# Patient Record
Sex: FEMALE | Race: OTHER | HISPANIC OR LATINO
[De-identification: names, ages, dates, MRNs, and addresses within clinical notes are randomized per-mention and may not be internally consistent; named-entity substitution may affect disease eponyms.]

---

## 2022-07-07 ENCOUNTER — APPOINTMENT (OUTPATIENT)
Dept: VASCULAR SURGERY | Facility: CLINIC | Age: 77
End: 2022-07-07

## 2023-08-31 ENCOUNTER — APPOINTMENT (OUTPATIENT)
Dept: GYNECOLOGIC ONCOLOGY | Facility: CLINIC | Age: 78
End: 2023-08-31
Payer: MEDICARE

## 2023-08-31 VITALS
TEMPERATURE: 97.8 F | BODY MASS INDEX: 30.29 KG/M2 | RESPIRATION RATE: 18 BRPM | DIASTOLIC BLOOD PRESSURE: 81 MMHG | HEART RATE: 60 BPM | SYSTOLIC BLOOD PRESSURE: 145 MMHG | OXYGEN SATURATION: 98 % | WEIGHT: 121 LBS

## 2023-08-31 PROCEDURE — 99213 OFFICE O/P EST LOW 20 MIN: CPT

## 2023-08-31 RX ORDER — LOSARTAN POTASSIUM AND HYDROCHLOROTHIAZIDE 50; 12.5 MG/1; MG/1
50-12.5 TABLET, FILM COATED ORAL
Refills: 0 | Status: ACTIVE | COMMUNITY

## 2023-08-31 RX ORDER — GABAPENTIN 100 MG
100 TABLET ORAL
Refills: 0 | Status: ACTIVE | COMMUNITY

## 2023-08-31 RX ORDER — MECLIZINE HYDROCHLORIDE 12.5 MG/1
12.5 TABLET ORAL
Refills: 0 | Status: ACTIVE | COMMUNITY

## 2023-08-31 NOTE — HISTORY OF PRESENT ILLNESS
[FreeTextEntry1] : [place]  used for the encounter  76 y/o patient with a history of endometrial cancer s/p surgery in 2019. She has not seen us since the pandemic started the last visit was in 7/2019 in the [facility].  She c/o vaginal dryness  she has been using a clotrimazole/betamethasone cream with some benefits. she reported no vaginal bleeding, no pain  Normal GI and  function.  Onc hx  9/12/2019 endometrioid carcinoma grade 1 10/2/2019 RA [facility]BS[facility] unsuccessful sentinel node evaluation, PLN pathology showed endometrioid carcinoma with superficial myometrial invasion, no eduardo involvement 0/9 pelvic nodes, stage IAG1   Mammogram is up to date colonoscopy up to date

## 2023-08-31 NOTE — ASSESSMENT
[FreeTextEntry1] : Patient is clinically LUBA her HCM is up to date she is clinically stable  she is c/o vaginal dryness clotrimazole/betamathasone seem to be helping will prescribe her more RTO 1 yaer or earlier if there is any issue. Answered all questions RTO 6 months

## 2023-08-31 NOTE — PHYSICAL EXAM
[Abnormal] : Examination of extremities for edema and/or varicosities: Abnormal [Absent] : Adnexa(ae): Absent [Normal] : Recto-Vaginal Exam: Normal [Fully active, able to carry on all pre-disease performance without restriction] : Status 0 - Fully active, able to carry on all pre-disease performance without restriction [de-identified] : some swelling of her lower extremities not tender

## 2023-11-10 ENCOUNTER — EMERGENCY (EMERGENCY)
Facility: HOSPITAL | Age: 78
LOS: 1 days | Discharge: HOME | End: 2023-11-10
Attending: EMERGENCY MEDICINE
Payer: COMMERCIAL

## 2023-11-10 VITALS
HEART RATE: 61 BPM | TEMPERATURE: 98 F | SYSTOLIC BLOOD PRESSURE: 135 MMHG | OXYGEN SATURATION: 96 % | RESPIRATION RATE: 18 BRPM | DIASTOLIC BLOOD PRESSURE: 74 MMHG

## 2023-11-10 VITALS
WEIGHT: 128.09 LBS | SYSTOLIC BLOOD PRESSURE: 138 MMHG | TEMPERATURE: 97 F | DIASTOLIC BLOOD PRESSURE: 82 MMHG | HEART RATE: 61 BPM | RESPIRATION RATE: 18 BRPM | OXYGEN SATURATION: 98 % | HEIGHT: 57 IN

## 2023-11-10 PROCEDURE — 70450 CT HEAD/BRAIN W/O DYE: CPT | Mod: 26,MA

## 2023-11-10 PROCEDURE — 70450 CT HEAD/BRAIN W/O DYE: CPT | Mod: MA

## 2023-11-10 PROCEDURE — 73522 X-RAY EXAM HIPS BI 3-4 VIEWS: CPT | Mod: 26

## 2023-11-10 PROCEDURE — 99284 EMERGENCY DEPT VISIT MOD MDM: CPT

## 2023-11-10 PROCEDURE — 73522 X-RAY EXAM HIPS BI 3-4 VIEWS: CPT

## 2023-11-10 PROCEDURE — 99284 EMERGENCY DEPT VISIT MOD MDM: CPT | Mod: 25

## 2023-11-10 RX ORDER — ACETAMINOPHEN 500 MG
650 TABLET ORAL ONCE
Refills: 0 | Status: COMPLETED | OUTPATIENT
Start: 2023-11-10 | End: 2023-11-10

## 2023-11-10 RX ADMIN — Medication 650 MILLIGRAM(S): at 14:45

## 2023-11-10 NOTE — ED PROVIDER NOTE - NS ED ROS FT
CONSTITUTIONAL: No fevers, no chills, no lightheadedness, no dizziness  EYES: no visual changes, no eye pain  EARS: no ear drainage, no ear pain, no change in hearing  NOSE: no nasal congestion  MOUTH/THROAT: no sore throat  CV: No chest pain, no palpitations  RESP: No SOB, no cough  GI: No n/v/d, no abd pain  : no dysuria, no hematuria, no flank pain  MSK: no back pain, + pelvic pain   SKIN: no rashes  NEURO: + headache, no focal weakness, no decreased sensation/parasthesias   PSYCHIATRIC: no known mental health issues

## 2023-11-10 NOTE — ED PROVIDER NOTE - PATIENT PORTAL LINK FT
You can access the FollowMyHealth Patient Portal offered by [facility] by registering at the following website: [email or web address]. By joining [facility]’s FollowMyHealth portal, you will also be able to view your health information using other applications (apps) compatible with our system.

## 2023-11-10 NOTE — ED PROVIDER NOTE - PHYSICAL EXAMINATION
GEN: NAD, awake, eyes open spontaneously  EYES: normal conjunctiva, perrl, eomi   ENT: NCAT, No palpable scalp hematoma, no raccoon eyes or ruth signs, MMM, Trachea midline  NECK: No midline cervical spine tenderness or step-offs.   CHEST/LUNGS: Non-tachypneic, CTAB, bilateral breath sounds  CARDIAC: Non-tachycardic, normal perfusion  ABDOMEN: Soft, NTND, No rebound/guarding  MSK: Tenderness of bilateral gluteal muscles, tenderness along bilateral ischial no gross deformity of extremities  SKIN: No rashes, no petechiae, no vesicles  NEURO: Ambulating with non-ataxic gait, CN grossly intact, normal coordination, no focal motor or sensory deficits  PSYCH: Alert, appropriate, cooperative, with capacity and insight

## 2023-11-10 NOTE — ED PROVIDER NOTE - ATTENDING CONTRIBUTION TO CARE
Attending MD York: I personally have seen and examined this patient.  Resident note reviewed and agree on plan of care and except where noted.  See below for details.     seen in [facility]    78F with PMH/PSH including HTN, CAD on ASA 81mg, presents to the ED with bilateral buttock pain and head trauma s/p fall yesterday. Reports was greeted by an excited 50lb pitbull, who jumped up on her, and force knocked her over. Reports hit back of head and buttocks on concrete. Reports had brief LOC. Reports bump to back of head is markedly improved since yesterday, has been applying ice.  Reports was able to stand and ambulate afterwards.  Denies change in vision, double vision, sudden loss of vision. Denies loss of urinary or bowel continence. Denies numbness, weakness or tingling in extremities. Denies chest pain, shortness of breath, abdominal pain, diarrhea, urinary complaints. Denies seizures, vomiting    Exam:   General: NAD  HENT: head NCAT except for +hematoma at back of head, airway patent  Eyes: anicteric, no conjunctival injection, PERRL, EOMI  Lungs: lungs CTAB with good inspiratory effort, no wheezing, no rhonchi, no rales  Cardiac: +S1S2, no obvious m/r/g  GI: abdomen soft with +BS, NT, ND  : no CVAT  MSK: ranging neck and extremities freely, +tenderness to bilateral glutes without limitation of ROM of hips, ambulating   Neuro: moving all extremities spontaneously, nonfocal  Psych: normal mood and affect     A/P: 78F s/p fall with head trauma, ?LOC yesterday, will evaluate for but not limited to ICH by obtaining CTH, low suspicion for brain bleed, will obtain XR hip with pelvis for screening and give analgesia, if nonactionable, can follow up with outpatient PMD

## 2023-11-10 NOTE — ED ADULT NURSE NOTE - OBJECTIVE STATEMENT
79 yo female presenting to ed ambulatory, A&ox3 complaining of fall. pt reports being ran into by 50lb dog, falling over and hitting the back of her head and sacrum on concrete. pt now complaining of lower back pain. denies chest pain, sob, fevers, headaches, dizziness. pt ambulated with assist. able to move all extremities. changed into gown for assessment. Safety and comfort measures provided, Awaiting results. 77 yo female presenting to ed ambulatory, A&ox3 complaining of fall. pt reports being ran into by 50lb dog, falling over and hitting the back of her head and sacrum on concrete. pt now complaining of bilatreal pelvic pain. unknown LOC.  denies chest pain, sob, fevers, headaches, dizziness. pt ambulated with assist. able to move all extremities. changed into gown for assessment. Safety and comfort measures provided, Awaiting results.

## 2023-11-10 NOTE — ED PROVIDER NOTE - PROGRESS NOTE DETAILS
Simona Richardson,  (PGY-1): XRs negative for fractures. Called CT tech, patient is next on line. Informed patient and daughter of XR results.

## 2023-11-10 NOTE — ED PROVIDER NOTE - OBJECTIVE STATEMENT
History obtained with the help of daughter at bedside for Wolof translation.     78 year old female with hx of HTN, CAD (scheduled for cardiac cath on 11/13/2023 with Dr. Rivero) presents with bilateral buttock pain and head trauma s/p fall yesterday. She states she was greeted by a 50lb pitbull and the force knocked her over. She hit the back of her head and buttocks on the concrete. She states her eyes blacked out momentarily. She was able to stand and ambulate afterwards with assistance. She takes ASA 81mg, but otherwis denies a/c use. No dizziness, lightheadedness, blurry vision, chest pain, shortness of breath, nausea, or vomiting. She states yesterday she had a large bump to the back of her head, however it is much improved today after applying ice.

## 2023-11-10 NOTE — ED PROVIDER NOTE - NSFOLLOWUPINSTRUCTIONS_ED_ALL_ED_FT
YOU WERE SEEN IN THE ED FOR: Fall with head trauma and pelvis pain     FOR PAIN/FEVER, YOU MAY TAKE TYLENOL (acetaminophen) AND/OR IBUPROFEN (advil or motrin). FOLLOW THE INSTRUCTIONS ON THE LABEL/CONTAINER.    YOU MAY ALSO BUY LIDOCAINE PATCHES (SALONPAS) AT THE PHARMACY AND APPLY IT TO THE AREA.     PLEASE FOLLOW UP WITH YOUR PRIVATE PHYSICIAN WITHIN THE NEXT 48 HOURS. BRING COPIES OF YOUR RESULTS.    RETURN TO THE EMERGENCY DEPARTMENT IF YOU EXPERIENCE ANY NEW/CONCERNING/WORSENING SYMPTOMS SUCH AS BUT NOT LIMITED TO: SEVERE HEADACHE, CONFUSION, DIFFICULTY WALKING, DIZZINESS, BLURRY VISION, OR ANY OTHER CONCERNS.

## 2023-11-10 NOTE — ED PROVIDER NOTE - CLINICAL SUMMARY MEDICAL DECISION MAKING FREE TEXT BOX
78 year old female with hx of HTN, CAD, on ASA 81mg, presents after a fall yesterday at 5:00pm with posterior head trauma and pelvic pain. ?LOC after the fall. Neurologically intact at this time, with benign neuro exam. Ambulating with full ROM of bilateral hips, however pain associated with sitting. Will obtain CT head r/o intracranial bleeding as well as hip/pelvis XR r/o fracture. Tylenol for pain and reassess.

## 2023-12-18 VITALS
TEMPERATURE: 98 F | HEIGHT: 59 IN | OXYGEN SATURATION: 96 % | SYSTOLIC BLOOD PRESSURE: 136 MMHG | DIASTOLIC BLOOD PRESSURE: 63 MMHG | HEART RATE: 65 BPM | RESPIRATION RATE: 16 BRPM | WEIGHT: 115.96 LBS

## 2023-12-18 RX ORDER — CHLORHEXIDINE GLUCONATE 213 G/1000ML
1 SOLUTION TOPICAL ONCE
Refills: 0 | Status: DISCONTINUED | OUTPATIENT
Start: 2023-12-19 | End: 2024-01-03

## 2023-12-18 NOTE — H&P ADULT - ASSESSMENT
79 yo F with PMHx of HTN, PVD s/p prior ablations, and TIA who presented to their outpt cardiologist complaining of TALBOT and chest pain progressing over the past few months. Pt states she can only ambulate 2 flights of stairs before needing to stop to rest with associated atypical chest pain, palpitations, diaphoresis associated with her TALBOT. In light of pts risk factors, CCS class III anginal symptoms and abnormal NST, pt now presents to [facility] for recommended cardiac catheterization with possible intervention if clinically indicated.    -Pt H+H, platelets stable (on outpatient labs as stated above), Pt without any reports of BRBPR, hematuria, prior ICH, melena and no recent or previous GI bleed.   -Loaded with: [ ]81mg ASA, [ ]75mg Plavix,  [ ] ASA 325mg [x ] Plavix 600mg, [ ] No Load [ ]. due to pt reporting compliance with aspirin 81mg qd (last dose today 12/19/23)  -Pt Cr. stable (on outpatient labs as stated above)- and LVEF 50%, pre cath fluids ordered per protocol [x]250ml IV bolus over 30 min, [ x] 75cc x2hrs, [ ] 50cc x2hrs, Patient euvolemic on exam.   -Malampatti II  -ASA III    Pt is a Candidate for Moderate Sedation, yes     was used for language British ID [id number]  Risks & benefits of procedure and alternative therapy have been explained to the patient including but not limited to: allergic reaction, bleeding w/possible need for blood transfusion, infection, renal and vascular compromise, limb damage, arrhythmia, stroke, vessel dissection/perforation, Myocardial infarction, emergent CABG. Informed consent obtained and in chart           77 yo F with PMHx of HTN, PVD s/p prior ablations, and TIA who presented to their outpt cardiologist complaining of TALBOT and chest pain progressing over the past few months. Pt states she can only ambulate 2 flights of stairs before needing to stop to rest with associated atypical chest pain, palpitations, diaphoresis associated with her TALBOT. In light of pts risk factors, CCS class III anginal symptoms and abnormal NST, pt now presents to [facility] for recommended cardiac catheterization with possible intervention if clinically indicated.    -Pt H+H, platelets stable (on outpatient labs as stated above), Pt without any reports of BRBPR, hematuria, prior ICH, melena and no recent or previous GI bleed.   -Loaded with: [ ]81mg ASA, [ ]75mg Plavix,  [ ] ASA 325mg [x ] Plavix 600mg, [ ] No Load [ ]. due to pt reporting compliance with aspirin 81mg qd (last dose today 12/19/23)  -Pt Cr. stable (on outpatient labs as stated above)- and LVEF 50%, pre cath fluids ordered per protocol [x]250ml IV bolus over 30 min, [ x] 75cc x2hrs, [ ] 50cc x2hrs, Patient euvolemic on exam.   -Malampatti II  -ASA III    Pt is a Candidate for Moderate Sedation, yes     was used for language Yemeni ID [id number]  Risks & benefits of procedure and alternative therapy have been explained to the patient including but not limited to: allergic reaction, bleeding w/possible need for blood transfusion, infection, renal and vascular compromise, limb damage, arrhythmia, stroke, vessel dissection/perforation, Myocardial infarction, emergent CABG. Informed consent obtained and in chart           79 yo F with PMHx of HTN, PVD s/p prior ablations, and TIA who presented to their outpt cardiologist complaining of TALBOT and chest pain progressing over the past few months. Pt states she can only ambulate 2 flights of stairs before needing to stop to rest with associated atypical chest pain, palpitations, diaphoresis associated with her TALBOT. In light of pts risk factors, CCS class III anginal symptoms and abnormal NST, pt now presents to [facility] for recommended cardiac catheterization with possible intervention if clinically indicated.    -Pt H+H, platelets stable (on outpatient labs as stated above), Pt without any reports of BRBPR, hematuria, prior ICH, melena and no recent or previous GI bleed.   -Loaded with: [ ]81mg ASA, [ ]75mg Plavix,  [ ] ASA 325mg [x ] Plavix 600mg, [ ] No Load [ ]. due to pt reporting compliance with aspirin 81mg qd (last dose today 12/19/23)  -Pt Cr. stable (on outpatient labs as stated above)- and LVEF 50%, pre cath fluids ordered per protocol [x]250ml IV bolus over 30 min, [ x] 75cc x2hrs, [ ] 50cc x2hrs, Patient euvolemic on exam.   -Malampatti II  -ASA III    ** Pt reported hx of shellfish allergy (w/ calamari, muscles but no issues w/ shrimp) with sxs of breathing difficulty needing hospital care with "some medication" +20 yrs ago. Discussed with MD, ordered Benadryl 25mg IV x 1 now and Solucortef 100mg IV x 1.      Pt is a Candidate for Moderate Sedation, yes     was used for language Andorran ID [id number]  Risks & benefits of procedure and alternative therapy have been explained to the patient including but not limited to: allergic reaction, bleeding w/possible need for blood transfusion, infection, renal and vascular compromise, limb damage, arrhythmia, stroke, vessel dissection/perforation, Myocardial infarction, emergent CABG. Informed consent obtained and in chart           77 yo F with PMHx of HTN, PVD s/p prior ablations, and TIA who presented to their outpt cardiologist complaining of TALBOT and chest pain progressing over the past few months. Pt states she can only ambulate 2 flights of stairs before needing to stop to rest with associated atypical chest pain, palpitations, diaphoresis associated with her TALBOT. In light of pts risk factors, CCS class III anginal symptoms and abnormal NST, pt now presents to [facility] for recommended cardiac catheterization with possible intervention if clinically indicated.    -Pt H+H, platelets stable (on outpatient labs as stated above), Pt without any reports of BRBPR, hematuria, prior ICH, melena and no recent or previous GI bleed.   -Loaded with: [ ]81mg ASA, [ ]75mg Plavix,  [ ] ASA 325mg [x ] Plavix 600mg, [ ] No Load [ ]. due to pt reporting compliance with aspirin 81mg qd (last dose today 12/19/23)  -Pt Cr. stable (on outpatient labs as stated above)- and LVEF 50%, pre cath fluids ordered per protocol [x]250ml IV bolus over 30 min, [ x] 75cc x2hrs, [ ] 50cc x2hrs, Patient euvolemic on exam.   -Malampatti II  -ASA III    ** Pt reported hx of shellfish allergy (w/ calamari, muscles but no issues w/ shrimp) with sxs of breathing difficulty needing hospital care with "some medication" +20 yrs ago. Discussed with MD, ordered Benadryl 25mg IV x 1 now and Solucortef 100mg IV x 1.      Pt is a Candidate for Moderate Sedation, yes     was used for language Salvadorean ID [id number]  Risks & benefits of procedure and alternative therapy have been explained to the patient including but not limited to: allergic reaction, bleeding w/possible need for blood transfusion, infection, renal and vascular compromise, limb damage, arrhythmia, stroke, vessel dissection/perforation, Myocardial infarction, emergent CABG. Informed consent obtained and in chart

## 2023-12-18 NOTE — H&P ADULT - NSHPLABSRESULTS_GEN_ALL_CORE
Per RN, patient is very hard stick. Dr. Rivero was able to review her most recent labs (drawn during last week) on his laptop as below. Per MD, recent outpatient labs acceptable, no need to repeat for now.    Hgb 13.1  Hct 38.8   Platelet 270  WBC 6.0  PT/PTT 11.1  INR 1.1  K 4.1  BUN 13  Cr 0.62  Glucose 76  GFR 91

## 2023-12-18 NOTE — H&P ADULT - HISTORY OF PRESENT ILLNESS
Cardiologist: Dr. Rivero  Pharmacy:   Escort:     77 yo F with PMHx of HTN, PVD s/p prior ablations, and TIA who presented to their outpt cardiologist complaining of TALBOT and chest pain progressing over the past few months. Pt states she can only ambulate 2 flights of stairs before needing to stop to rest with associated atypical chest pain, palpitations, diaphoresis associated with her TALBOT. Pt denies any ___ CP, palpitations, dizziness, syncope, diaphoresis, fatigue, LE edema, SOB, TALBOT, orthopnea, PND, N/V/D, abd pain, cough, congestion, fever, chills or recent sick contact.    NST 9/27/23: Abnormal fctn w RWMA. Reversible moderate defect in lateral, inferior, and apical regions.   TTE 9/1/23: Mild concentric LVH. EF normal 60%. G1DD. Mild AV calcification. Mild MR.     In light of pts risk factors, CCS class III anginal symptoms and abnormal NST, pt now presents to [facility] for recommended cardiac catheterization with possible intervention if clinically indicated.       Cardiologist: Dr. Rivero  Pharmacy:   Escort:     79 yo F with PMHx of HTN, PVD s/p prior ablations, and TIA who presented to their outpt cardiologist complaining of TALBOT and chest pain progressing over the past few months. Pt states she can only ambulate 2 flights of stairs before needing to stop to rest with associated atypical chest pain, palpitations, diaphoresis associated with her TALBOT. Pt denies any ___ CP, palpitations, dizziness, syncope, diaphoresis, fatigue, LE edema, SOB, TALBOT, orthopnea, PND, N/V/D, abd pain, cough, congestion, fever, chills or recent sick contact.    NST 9/27/23: Abnormal fctn w RWMA. Reversible moderate defect in lateral, inferior, and apical regions.   TTE 9/1/23: Mild concentric LVH. EF normal 60%. G1DD. Mild AV calcification. Mild MR.     In light of pts risk factors, CCS class III anginal symptoms and abnormal NST, pt now presents to [facility] for recommended cardiac catheterization with possible intervention if clinically indicated.       Cardiologist: Dr. Rivero  Pharmacy: [facility]  Escort: Son    79 yo F with PMHx of HTN, PVD s/p prior ablations, and TIA who presented to their outpt cardiologist complaining of TALBOT and chest pain progressing over the past few months. Pt states she can only ambulate 2 flights of stairs before needing to stop to rest with associated atypical chest pain, palpitations, diaphoresis associated with her TALBOT. Pt denies any current CP, palpitations, dizziness, syncope, diaphoresis, fatigue, LE edema, SOB, TALBOT, orthopnea, PND, N/V/D, abd pain, cough, congestion, fever, chills or recent sick contact.    NST 9/27/23: Abnormal fctn w RWMA. Reversible moderate defect in lateral, inferior, and apical regions.   TTE 9/1/23: Mild concentric LVH. EF normal 60%. G1DD. Mild AV calcification. Mild MR.     In light of pts risk factors, CCS class III anginal symptoms and abnormal NST, pt now presents to [facility] for recommended cardiac catheterization with possible intervention if clinically indicated.       Cardiologist: Dr. Rivero  Pharmacy: [facility]  Escort: Son    77 yo F with PMHx of HTN, PVD s/p prior ablations, and TIA who presented to their outpt cardiologist complaining of TALBOT and chest pain progressing over the past few months. Pt states she can only ambulate 2 flights of stairs before needing to stop to rest with associated atypical chest pain, palpitations, diaphoresis associated with her TALBOT. Pt denies any current CP, palpitations, dizziness, syncope, diaphoresis, fatigue, LE edema, SOB, TALBOT, orthopnea, PND, N/V/D, abd pain, cough, congestion, fever, chills or recent sick contact.    NST 9/27/23: Abnormal fctn w RWMA. Reversible moderate defect in lateral, inferior, and apical regions.   TTE 9/1/23: Mild concentric LVH. EF normal 60%. G1DD. Mild AV calcification. Mild MR.     In light of pts risk factors, CCS class III anginal symptoms and abnormal NST, pt now presents to [facility] for recommended cardiac catheterization with possible intervention if clinically indicated.

## 2023-12-19 ENCOUNTER — OUTPATIENT (OUTPATIENT)
Dept: OUTPATIENT SERVICES | Facility: HOSPITAL | Age: 78
LOS: 1 days | Discharge: HOME | End: 2023-12-19
Payer: MEDICARE

## 2023-12-19 PROCEDURE — C1894: CPT

## 2023-12-19 PROCEDURE — 93010 ELECTROCARDIOGRAM REPORT: CPT

## 2023-12-19 PROCEDURE — 93458 L HRT ARTERY/VENTRICLE ANGIO: CPT

## 2023-12-19 PROCEDURE — 93005 ELECTROCARDIOGRAM TRACING: CPT

## 2023-12-19 PROCEDURE — C1887: CPT

## 2023-12-19 PROCEDURE — C1769: CPT

## 2023-12-19 PROCEDURE — 99152 MOD SED SAME PHYS/QHP 5/>YRS: CPT

## 2023-12-19 RX ORDER — MECLIZINE HCL 12.5 MG
1 TABLET ORAL
Refills: 0 | DISCHARGE

## 2023-12-19 RX ORDER — CLOPIDOGREL BISULFATE 75 MG/1
600 TABLET, FILM COATED ORAL ONCE
Refills: 0 | Status: COMPLETED | OUTPATIENT
Start: 2023-12-19 | End: 2023-12-19

## 2023-12-19 RX ORDER — ASPIRIN/CALCIUM CARB/MAGNESIUM 324 MG
1 TABLET ORAL
Refills: 0 | DISCHARGE

## 2023-12-19 RX ORDER — ATORVASTATIN CALCIUM 80 MG/1
1 TABLET, FILM COATED ORAL
Refills: 0 | DISCHARGE

## 2023-12-19 RX ORDER — DIPHENHYDRAMINE HCL 50 MG
25 CAPSULE ORAL ONCE
Refills: 0 | Status: COMPLETED | OUTPATIENT
Start: 2023-12-19 | End: 2023-12-19

## 2023-12-19 RX ORDER — AMLODIPINE BESYLATE 2.5 MG/1
1 TABLET ORAL
Refills: 0 | DISCHARGE

## 2023-12-19 RX ORDER — GABAPENTIN 400 MG/1
1 CAPSULE ORAL
Refills: 0 | DISCHARGE

## 2023-12-19 RX ORDER — SODIUM CHLORIDE 9 MG/ML
500 INJECTION INTRAMUSCULAR; INTRAVENOUS; SUBCUTANEOUS
Refills: 0 | Status: DISCONTINUED | OUTPATIENT
Start: 2023-12-19 | End: 2024-01-03

## 2023-12-19 RX ORDER — LOSARTAN/HYDROCHLOROTHIAZIDE 100MG-25MG
1 TABLET ORAL
Refills: 0 | DISCHARGE

## 2023-12-19 RX ORDER — SODIUM CHLORIDE 9 MG/ML
1000 INJECTION INTRAMUSCULAR; INTRAVENOUS; SUBCUTANEOUS
Refills: 0 | Status: DISCONTINUED | OUTPATIENT
Start: 2023-12-19 | End: 2024-01-03

## 2023-12-19 RX ORDER — LOSARTAN POTASSIUM 100 MG/1
1 TABLET, FILM COATED ORAL
Refills: 0 | DISCHARGE

## 2023-12-19 RX ORDER — HYDROCORTISONE 20 MG
100 TABLET ORAL ONCE
Refills: 0 | Status: COMPLETED | OUTPATIENT
Start: 2023-12-19 | End: 2023-12-19

## 2023-12-19 RX ORDER — SODIUM CHLORIDE 9 MG/ML
250 INJECTION INTRAMUSCULAR; INTRAVENOUS; SUBCUTANEOUS ONCE
Refills: 0 | Status: COMPLETED | OUTPATIENT
Start: 2023-12-19 | End: 2023-12-19

## 2023-12-19 RX ORDER — RANOLAZINE 500 MG/1
500 TABLET, FILM COATED, EXTENDED RELEASE ORAL
Refills: 0 | DISCHARGE

## 2023-12-19 RX ADMIN — SODIUM CHLORIDE 75 MILLILITER(S): 9 INJECTION INTRAMUSCULAR; INTRAVENOUS; SUBCUTANEOUS at 15:37

## 2023-12-19 RX ADMIN — CLOPIDOGREL BISULFATE 600 MILLIGRAM(S): 75 TABLET, FILM COATED ORAL at 15:56

## 2023-12-19 RX ADMIN — Medication 100 MILLIGRAM(S): at 15:36

## 2023-12-19 RX ADMIN — SODIUM CHLORIDE 500 MILLILITER(S): 9 INJECTION INTRAMUSCULAR; INTRAVENOUS; SUBCUTANEOUS at 15:38

## 2023-12-19 RX ADMIN — Medication 25 MILLIGRAM(S): at 15:37

## 2023-12-19 NOTE — PROGRESS NOTE ADULT - SUBJECTIVE AND OBJECTIVE BOX
Interventional Cardiology PA SDA Discharge Note    Patient without complaints. Ambulated and voided without difficulties  Afebrile, VSS  Ext: Right Radial :  no  hematoma,   no  bleeding, dressing; C/D/I  Pulses:    intact RAD/DP/PT?to baseline     A/P:    s/p diagnostic LHC (12/19/23):  minor luminal irregularities; LVEDP 8mmHg.   ACCESS: right radial artery   Post cath IVF: NS 150cc/hr x2hrs.     Patient instructed to STOP taking her home Aspirin 81mg PO QD, Amlodipine 2.5mg PO QD, and Renexa 500mg PO BID.     1.	Stable for discharge as per attending Dr. Rivero after bed rest, pt voids, groin/wrist stable and 30 minutes of ambulation.  2.	Follow-up with PMD/Cardiologist Dr. Rivero in 1-2 weeks  3.	Discharged forms signed and copies in chart

## 2023-12-22 DIAGNOSIS — I25.110 ATHEROSCLEROTIC HEART DISEASE OF NATIVE CORONARY ARTERY WITH UNSTABLE ANGINA PECTORIS: ICD-10-CM

## 2023-12-22 DIAGNOSIS — R94.39 ABNORMAL RESULT OF OTHER CARDIOVASCULAR FUNCTION STUDY: ICD-10-CM

## 2024-01-16 PROBLEM — I25.10 ATHEROSCLEROTIC HEART DISEASE OF NATIVE CORONARY ARTERY WITHOUT ANGINA PECTORIS: Chronic | Status: ACTIVE | Noted: 2023-11-10

## 2024-01-16 PROBLEM — G45.9 TRANSIENT CEREBRAL ISCHEMIC ATTACK, UNSPECIFIED: Chronic | Status: ACTIVE | Noted: 2023-12-18

## 2024-01-16 PROBLEM — I10 ESSENTIAL (PRIMARY) HYPERTENSION: Chronic | Status: ACTIVE | Noted: 2023-11-10

## 2024-02-06 ENCOUNTER — APPOINTMENT (OUTPATIENT)
Dept: GYNECOLOGIC ONCOLOGY | Facility: CLINIC | Age: 79
End: 2024-02-06
Payer: MEDICARE

## 2024-02-06 VITALS
DIASTOLIC BLOOD PRESSURE: 79 MMHG | RESPIRATION RATE: 16 BRPM | OXYGEN SATURATION: 99 % | HEART RATE: 65 BPM | TEMPERATURE: 97.6 F | WEIGHT: 118 LBS | SYSTOLIC BLOOD PRESSURE: 127 MMHG | BODY MASS INDEX: 29.54 KG/M2

## 2024-02-06 DIAGNOSIS — N89.8 OTHER SPECIFIED NONINFLAMMATORY DISORDERS OF VAGINA: ICD-10-CM

## 2024-02-06 DIAGNOSIS — C54.1 MALIGNANT NEOPLASM OF ENDOMETRIUM: ICD-10-CM

## 2024-02-06 PROCEDURE — 99213 OFFICE O/P EST LOW 20 MIN: CPT

## 2024-02-06 RX ORDER — CLOTRIMAZOLE AND BETAMETHASONE DIPROPIONATE 10; .5 MG/G; MG/G
1-0.05 CREAM TOPICAL TWICE DAILY
Qty: 1 | Refills: 1 | Status: ACTIVE | COMMUNITY
Start: 2024-02-06 | End: 1900-01-01

## 2024-02-06 RX ORDER — CLOTRIMAZOLE AND BETAMETHASONE DIPROPIONATE 10; .5 MG/G; MG/G
1-0.05 CREAM TOPICAL
Refills: 0 | Status: ACTIVE | COMMUNITY

## 2024-02-06 NOTE — HISTORY OF PRESENT ILLNESS
[FreeTextEntry1] : [place]  used for the encounter  76 y/o patient with a history of endometrial cancer s/p surgery in 2019. She has not seen us since the pandemic started the last visit was in 7/2019 in the [place].  She c/o vaginal dryness  she has been using a clotrimazole/betamethasone cream with some benefits.  I wrote her for a refill today she reported no vaginal bleeding, no pain  Normal GI and  function.  She has frequent urination especially during the day.  She does take a diuretic prescription for her hypertension during the day.  Daughter has to be watchful and cognizance of her urinary issue when they go out together. She does not have any symptoms of incontinence.  Her last mammogram was Several years ago  Onc hx  9/12/2019 endometrioid carcinoma grade 1 10/2/2019 [facility] unsuccessful sentinel node evaluation, PLN pathology showed endometrioid carcinoma with superficial myometrial invasion, no eduardo involvement 0/9 pelvic nodes, stage IAG1   Mammogram is missing this last two years colonoscopy up to date

## 2024-02-06 NOTE — ASSESSMENT
[FreeTextEntry1] : The patient is clinically stable. No evidence of any disease present. Mammograms ordered Her clotrimazole betamethasone cream was also renewed. She will have another appointment in 6 months.  After that she can be switched over to a once a year checkup.  General Healthcare maintenance discussed and mammogram ordered

## 2024-08-05 ENCOUNTER — NON-APPOINTMENT (OUTPATIENT)
Age: 79
End: 2024-08-05

## 2024-08-06 ENCOUNTER — APPOINTMENT (OUTPATIENT)
Dept: GYNECOLOGIC ONCOLOGY | Facility: CLINIC | Age: 79
End: 2024-08-06

## 2024-08-06 PROBLEM — Z85.42 HISTORY OF ENDOMETRIAL CANCER: Status: RESOLVED | Noted: 2024-08-06 | Resolved: 2024-08-06

## 2024-08-06 PROCEDURE — 99213 OFFICE O/P EST LOW 20 MIN: CPT

## 2024-08-06 PROCEDURE — 99459 PELVIC EXAMINATION: CPT

## 2024-08-06 PROCEDURE — G2211 COMPLEX E/M VISIT ADD ON: CPT

## 2024-08-06 NOTE — HISTORY OF PRESENT ILLNESS
[FreeTextEntry1] : Patient was accompanied by her granddaughter  76 y/o patient with a history of endometrial cancer s/p surgery in 2019. She was lost to follow-up during the pandemic. She return to see me here at [facility] last year.  2/6/24  She c/o vaginal dryness  she has been using a clotrimazole/betamethasone cream with some benefits.  I wrote her for a refill in her last visit She has frequent urination especially during the day.  She does take a diuretic prescription for her hypertension during the day.  Daughter has to be watchful and cognizance of her urinary issue when they go out together. She does not have any symptoms of incontinence.   Patient feels well today.  She has no complaints.  And there is no pain no bleeding.  Onc hx  9/12/2019 endometrioid carcinoma grade 1 10/2/2019 DIAN LEONE unsuccessful sentinel node evaluation, PLN pathology showed endometrioid carcinoma with superficial myometrial invasion, no eduardo involvement 0/9 pelvic nodes, stage IAG1   Mammogram is missing this last two years  she said she had one done earlier this year colonoscopy up to date

## 2024-08-06 NOTE — PHYSICAL EXAM
[Chaperone Present] : A chaperone was present in the examining room during all aspects of the physical examination [[id number]] : A chaperone was present during the pelvic exam. [FreeTextEntry2] : Liseth [Abnormal] : Examination of extremities for edema and/or varicosities: Abnormal [Absent] : Adnexa(ae): Absent [Normal] : Recto-Vaginal Exam: Normal [de-identified] : some swelling of her lower extremities not tender [Fully active, able to carry on all pre-disease performance without restriction] : Status 0 - Fully active, able to carry on all pre-disease performance without restriction

## 2024-08-06 NOTE — ASSESSMENT
[FreeTextEntry1] : Patient is clinically stable. No evidence of disease.  Extend her surveillance visit to once a year only. Healthcare maintenance is up-to-date.  Diet and exercise if possible.

## 2025-08-05 ENCOUNTER — APPOINTMENT (OUTPATIENT)
Dept: GYNECOLOGIC ONCOLOGY | Facility: CLINIC | Age: 80
End: 2025-08-05
Payer: MEDICARE

## 2025-08-05 VITALS
RESPIRATION RATE: 16 BRPM | WEIGHT: 114 LBS | OXYGEN SATURATION: 95 % | TEMPERATURE: 97.7 F | HEART RATE: 58 BPM | DIASTOLIC BLOOD PRESSURE: 68 MMHG | BODY MASS INDEX: 28.53 KG/M2 | SYSTOLIC BLOOD PRESSURE: 109 MMHG

## 2025-08-05 DIAGNOSIS — C54.1 MALIGNANT NEOPLASM OF ENDOMETRIUM: ICD-10-CM

## 2025-08-05 PROCEDURE — 99459 PELVIC EXAMINATION: CPT

## 2025-08-05 PROCEDURE — 99213 OFFICE O/P EST LOW 20 MIN: CPT

## 2025-08-05 RX ORDER — CLOTRIMAZOLE AND BETAMETHASONE DIPROPIONATE 10; .5 MG/G; MG/G
1-0.05 CREAM TOPICAL TWICE DAILY
Qty: 1 | Refills: 2 | Status: ACTIVE | COMMUNITY
Start: 2025-08-05 | End: 1900-01-01